# Patient Record
Sex: MALE | Race: WHITE | NOT HISPANIC OR LATINO
[De-identification: names, ages, dates, MRNs, and addresses within clinical notes are randomized per-mention and may not be internally consistent; named-entity substitution may affect disease eponyms.]

---

## 2020-04-30 ENCOUNTER — APPOINTMENT (OUTPATIENT)
Dept: RADIOLOGY | Facility: HOSPITAL | Age: 65
End: 2020-04-30
Payer: COMMERCIAL

## 2020-04-30 ENCOUNTER — RESULT REVIEW (OUTPATIENT)
Age: 65
End: 2020-04-30

## 2020-04-30 ENCOUNTER — OUTPATIENT (OUTPATIENT)
Dept: OUTPATIENT SERVICES | Facility: HOSPITAL | Age: 65
LOS: 1 days | End: 2020-04-30
Payer: COMMERCIAL

## 2020-04-30 DIAGNOSIS — M12.819 OTHER SPECIFIC ARTHROPATHIES, NOT ELSEWHERE CLASSIFIED, UNSPECIFIED SHOULDER: ICD-10-CM

## 2020-04-30 PROBLEM — Z00.00 ENCOUNTER FOR PREVENTIVE HEALTH EXAMINATION: Status: ACTIVE | Noted: 2020-04-30

## 2020-04-30 PROCEDURE — 73030 X-RAY EXAM OF SHOULDER: CPT

## 2020-04-30 PROCEDURE — 73030 X-RAY EXAM OF SHOULDER: CPT | Mod: 26,RT

## 2020-05-18 ENCOUNTER — APPOINTMENT (OUTPATIENT)
Dept: ORTHOPEDIC SURGERY | Facility: CLINIC | Age: 65
End: 2020-05-18
Payer: COMMERCIAL

## 2020-05-18 VITALS — HEIGHT: 67 IN | BODY MASS INDEX: 25.9 KG/M2 | WEIGHT: 165 LBS

## 2020-05-18 DIAGNOSIS — M25.511 PAIN IN RIGHT SHOULDER: ICD-10-CM

## 2020-05-18 PROCEDURE — 99203 OFFICE O/P NEW LOW 30 MIN: CPT

## 2020-05-18 RX ORDER — TRAMADOL HYDROCHLORIDE 50 MG/1
50 TABLET, COATED ORAL
Qty: 30 | Refills: 0 | Status: ACTIVE | COMMUNITY
Start: 2020-05-18 | End: 1900-01-01

## 2020-05-18 RX ORDER — DIAZEPAM 5 MG/1
5 TABLET ORAL
Qty: 2 | Refills: 0 | Status: ACTIVE | COMMUNITY
Start: 2020-05-18 | End: 1900-01-01

## 2020-05-18 NOTE — PHYSICAL EXAM
[de-identified] : Right Upper Extremity\par o Shoulder :\par ¦ Inspection/Palpation : mild tenderness greater tuberosity, no swelling, no deformities\par ¦ Range of Motion : ACTIVE FORWARD ELEVATION: Measured at 150 degrees, ACTIVE EXTERNAL ROTATION: Measured at 70 degrees with pain , ACTIVE INTERNAL ROTATION: Measured at L1 degrees\par ¦ Strength : external rotation 5/5, internal rotation 5/5, supraspinatus 4/5\par ¦ Stability : no joint instability on provocative testing\par ¦ Tests/Signs : Neer (-), Jonah (+) Speed (-)\par o Upper Arm : no tenderness, no swelling, no deformities\par o Muscle Bulk : no atrophy\par o Sensation : sensation intact to light touch\par o Skin : no skin rash or discoloration\par o Vascular Exam : no edema, no cyanosis, radial and ulnar pulses normal\par \par Left Upper Extremity\par o Shoulder :\par ¦ Inspection/Palpation : no tenderness, no swelling, no deformities\par ¦ Range of Motion : ACTIVE FORWARD ELEVATION: Measured at 155 degrees, ACTIVE EXTERNAL ROTATION: Measured at 75 degrees, ACTIVE INTERNAL ROTATION: Measured at T12\par ¦ Strength : external rotation 5/5, internal rotation 5/5, supraspinatus 5/5\par ¦ Stability : no joint instability on provocative testing\par ¦ Tests/Signs : Neer (-), Mckenzie (-)\par o Upper Arm : no tenderness, no swelling, no deformities\par o Muscle Bulk : no atrophy\par o Sensation : sensation intact to light touch\par o Skin : no skin rash or discoloration\par o Vascular Exam : no edema, no cyanosis, radial and ulnar pulses normal\par \par \par \par \par  [de-identified] : o Xray Right Shoulder performed on 4/30/2020 at [facility]: Impression:\par ¦ Degenerative changes noted in the region of the acromioclavicular joint. \par \par

## 2020-05-18 NOTE — DISCUSSION/SUMMARY
[de-identified] : The underlying pathophysiology was reviewed in great detail with the patient as well as the various treatment options, including ice, analgesics, NSAIDs, Physical therapy, steroid injections.\par \par A prescription for a right shoulder MRI was provided to rule out rotator cuff tear. A prescription was provided for 2 tablets of Valium for the MRI due to claustrophobia. \par \par A prescription was provided for Tramadol \par \par FU once MRI results are obtained. \par \par All questions were answered, all alternatives discussed and the patient is in complete agreement with that plan. Follow-up appointment as instructed. Any issues and the patient will call or come in sooner.\par

## 2020-05-18 NOTE — HISTORY OF PRESENT ILLNESS
[de-identified] : SHALINI LERMA is a 64 year male RHD presenting to the office complaining of right shoulder pain.  Patient reports pain since Mid Feburary when he fell on his right shoulder while skiing.The patient describes the pain as a dull aching, and occasionally sharp pain localized to the anterior aspect of his right shoulder that is intermittent in nature. His  symptoms are exacerbated with any movement of the shoulder. Patient reports the pain does wake him up at night. \par Patient is taking NSAIDs for pain relief with good relief in symptoms. \par Patient denies any other complaints at this time.\par \par

## 2020-05-26 ENCOUNTER — OUTPATIENT (OUTPATIENT)
Dept: OUTPATIENT SERVICES | Facility: HOSPITAL | Age: 65
LOS: 1 days | End: 2020-05-26

## 2020-05-26 ENCOUNTER — APPOINTMENT (OUTPATIENT)
Dept: MRI IMAGING | Facility: IMAGING CENTER | Age: 65
End: 2020-05-26

## 2020-05-26 DIAGNOSIS — M25.511 PAIN IN RIGHT SHOULDER: ICD-10-CM

## 2020-06-02 ENCOUNTER — OUTPATIENT (OUTPATIENT)
Dept: OUTPATIENT SERVICES | Facility: HOSPITAL | Age: 65
LOS: 1 days | End: 2020-06-02
Payer: COMMERCIAL

## 2020-06-02 ENCOUNTER — APPOINTMENT (OUTPATIENT)
Dept: MRI IMAGING | Facility: CLINIC | Age: 65
End: 2020-06-02
Payer: COMMERCIAL

## 2020-06-02 DIAGNOSIS — Z00.8 ENCOUNTER FOR OTHER GENERAL EXAMINATION: ICD-10-CM

## 2020-06-02 PROCEDURE — 73221 MRI JOINT UPR EXTREM W/O DYE: CPT

## 2020-06-02 PROCEDURE — 73221 MRI JOINT UPR EXTREM W/O DYE: CPT | Mod: 26,RT

## 2020-06-09 ENCOUNTER — APPOINTMENT (OUTPATIENT)
Dept: ORTHOPEDIC SURGERY | Facility: CLINIC | Age: 65
End: 2020-06-09
Payer: COMMERCIAL

## 2020-06-09 PROCEDURE — 99453 REM MNTR PHYSIOL PARAM SETUP: CPT

## 2020-06-09 PROCEDURE — 99214 OFFICE O/P EST MOD 30 MIN: CPT | Mod: 25

## 2020-06-09 NOTE — HISTORY OF PRESENT ILLNESS
[de-identified] : SHALINI LERMA is a 64 year male RHD presenting to the office complaining of right shoulder pain.  Patient reports pain since Mid February when he fell on his right shoulder while skiing.The patient describes the pain as a dull aching, and occasionally sharp pain localized to the anterior aspect of his right shoulder that is intermittent in nature. His  symptoms are exacerbated with any movement of the shoulder. Patient reports the pain does wake him up at night. Patient is taking NSAIDs for pain relief with good relief in symptoms.  Patient is here today to review and discuss his MRI results of the left shoulder. Patient denies any other complaints at this time.\par \par

## 2020-06-09 NOTE — DISCUSSION/SUMMARY
[de-identified] : The underlying pathophysiology was reviewed in great detail with the patient as well as the various treatment options, including ice, analgesics, NSAIDs, Physical therapy, steroid injections and surgery.\par \par MRI was discussed and review at great length today. \par \par The patient wishes to proceed with SURGICAL INTERVENTION at this time. The risks and benefits of a ROTATOR CUFF REPAIR were discussed in great detail today, including but not limited to bleeding, infection, nerve injury, DVT, allergy to the anesthetic or to the implants, persistent pain, stiffness, scarring, swelling or deformity, or re-tear\par \par Surgical Outcomes System (SOS) was discussed at great length today. Patient consented and signed waiver.

## 2020-06-09 NOTE — PHYSICAL EXAM
[de-identified] : Right Upper Extremity\par o Shoulder :\par ¦ Inspection/Palpation : tenderness greater tuberosity, minimal tenderness AC joint no swelling, no deformities\par ¦ Range of Motion : ACTIVE FORWARD ELEVATION: Measured at 130 degrees with pain, ACTIVE EXTERNAL ROTATION: Measured at 55 degrees with pain , ACTIVE INTERNAL ROTATION: Measured at L5 degrees\par ¦ Strength : external rotation 5-/5, internal rotation 5/5, supraspinatus 4-/5 with pain\par ¦ Stability : no joint instability on provocative testing\par ¦ Tests/Signs : Neer (+ mild), Mckenzie (+) Speed (-)\par o Upper Arm : no tenderness, no swelling, no deformities\par o Muscle Bulk : no atrophy\par o Sensation : sensation intact to light touch\par o Skin : no skin rash or discoloration\par o Vascular Exam : no edema, no cyanosis, radial and ulnar pulses normal\par \par Left Upper Extremity\par o Shoulder :\par ¦ Inspection/Palpation : no tenderness, no swelling, no deformities\par ¦ Range of Motion : ACTIVE FORWARD ELEVATION: Measured at 155 degrees, ACTIVE EXTERNAL ROTATION: Measured at 75 degrees, ACTIVE INTERNAL ROTATION: Measured at T12\par ¦ Strength : external rotation 5/5, internal rotation 5/5, supraspinatus 5/5\par ¦ Stability : no joint instability on provocative testing\par ¦ Tests/Signs : Neer (-), Mckenzie (-)\par o Upper Arm : no tenderness, no swelling, no deformities\par o Muscle Bulk : no atrophy\par o Sensation : sensation intact to light touch\par o Skin : no skin rash or discoloration\par o Vascular Exam : no edema, no cyanosis, radial and ulnar pulses normal\par \par \par \par \par  [de-identified] : MRI of the Right shoulder performed on 06/02/2020: impression:\par 1. Full-thickness, full width tear of the supraspinatus tendon from its insertion with retraction, as above. \par 2. Advanced acromioclavicular osteoarthritis. \par 3. SLAP tear.

## 2020-07-09 VITALS
OXYGEN SATURATION: 98 % | TEMPERATURE: 99 F | SYSTOLIC BLOOD PRESSURE: 135 MMHG | HEART RATE: 66 BPM | HEIGHT: 67 IN | RESPIRATION RATE: 10 BRPM | WEIGHT: 171.3 LBS | DIASTOLIC BLOOD PRESSURE: 67 MMHG

## 2020-07-09 NOTE — H&P PST ADULT - NSICDXPROBLEM_GEN_ALL_CORE_FT
PROBLEM DIAGNOSES  Problem: Right shoulder pain  Assessment and Plan: right shoulder arthroscopic rotator cuff repair

## 2020-07-09 NOTE — H&P PST ADULT - RS GEN PE MLT RESP DETAILS PC
no rhonchi/no wheezes/good air movement/airway patent/no rales/breath sounds equal/clear to auscultation bilaterally/respirations non-labored

## 2020-07-09 NOTE — H&P PST ADULT - MUSCULOSKELETAL
details… detailed exam no joint swelling/right shoulder/decreased ROM due to pain/no joint erythema/diminished strength/no joint warmth/no calf tenderness/decreased ROM

## 2020-07-09 NOTE — H&P PST ADULT - HISTORY OF PRESENT ILLNESS
this is a 66 y/o male who has had right shoulder pain, weakness and decreased ROM since ski injury 2/20; tests show muscle torn off bone; to have repair of same

## 2020-07-09 NOTE — H&P PST ADULT - FUNCTIONAL SCREEN CURRENT LEVEL: BATHING, MLM
REVIEW OF SYSTEMS:  GENERAL: Patient denies fevers,chills,night sweats, excessive fatigue, anorexia, weight loss  ALLERGIC/IMMUNOLOGIC: no reactions  EYES: Patient denies diplopia, significant visual difficulties   ENT/MOUTH: Patient denies mucositis, problems with hearing, sore throat, or mouth sores, sinus drainage  ENDOCRINE: Patient denies diabetes, thyroid disease, hormone replacement, hot flashes or night sweats  HEMATOLOGIC/LYMPHATIC: Patient denies easy bruising or bleeding, tender or palpable lymph nodes  BREASTS: Patient denies abnormal masses of breast, nipple discharge, pain  RESPIRATORY:Patient denies dyspnea on exertion, chest pain, cough, hemoptysis  CARDIOVASCULAR: Patient denies anginal chest pain, palpitations, orthopnea,   peripheral edema  GASTROINTESTINAL: Patient denies nausea, vomiting, diarrhea, Gi bleeding,   constipation, change in bowel habits, heartburn, early satiety   : Patient denies abnormal genital masses, hematuria, hesitancy, dysuria,increase frequency, urgency, incontinence, problems with sexual activity  MUSCULOSKELETAL: Patient denies joint pain, swelling or redness, decreased range of motion +bilat hip pain comes and goes  SKIN: Patient denies chronic rashes, inflammation, ulceration or skin changes  NEUROLOGIC: Patient denies headache, blurred vision, areas of focal weakness or numbness, abnormal gait, sensory problems  PSYCHIATRIC: Patient denies insomnia, depression, anxiety, mood swings, oneal, psychotropic drugs       0 = independent

## 2020-07-09 NOTE — H&P PST ADULT - NSANTHOSAYNRD_GEN_A_CORE
No. AVE screening performed.  STOP BANG Legend: 0-2 = LOW Risk; 3-4 = INTERMEDIATE Risk; 5-8 = HIGH Risk

## 2020-07-13 ENCOUNTER — OUTPATIENT (OUTPATIENT)
Dept: OUTPATIENT SERVICES | Facility: HOSPITAL | Age: 65
LOS: 1 days | End: 2020-07-13
Payer: COMMERCIAL

## 2020-07-13 DIAGNOSIS — M75.101 UNSPECIFIED ROTATOR CUFF TEAR OR RUPTURE OF RIGHT SHOULDER, NOT SPECIFIED AS TRAUMATIC: ICD-10-CM

## 2020-07-13 DIAGNOSIS — M25.511 PAIN IN RIGHT SHOULDER: ICD-10-CM

## 2020-07-13 DIAGNOSIS — Z90.89 ACQUIRED ABSENCE OF OTHER ORGANS: Chronic | ICD-10-CM

## 2020-07-13 DIAGNOSIS — Z01.818 ENCOUNTER FOR OTHER PREPROCEDURAL EXAMINATION: ICD-10-CM

## 2020-07-13 DIAGNOSIS — Z98.52 VASECTOMY STATUS: Chronic | ICD-10-CM

## 2020-07-13 LAB — SARS-COV-2 RNA SPEC QL NAA+PROBE: SIGNIFICANT CHANGE UP

## 2020-07-13 PROCEDURE — U0003: CPT

## 2020-07-13 PROCEDURE — G0463: CPT

## 2020-07-14 ENCOUNTER — TRANSCRIPTION ENCOUNTER (OUTPATIENT)
Age: 65
End: 2020-07-14

## 2020-07-15 ENCOUNTER — RESULT REVIEW (OUTPATIENT)
Age: 65
End: 2020-07-15

## 2020-07-15 ENCOUNTER — APPOINTMENT (OUTPATIENT)
Dept: ORTHOPEDIC SURGERY | Facility: HOSPITAL | Age: 65
End: 2020-07-15

## 2020-07-15 ENCOUNTER — OUTPATIENT (OUTPATIENT)
Dept: OUTPATIENT SERVICES | Facility: HOSPITAL | Age: 65
LOS: 1 days | End: 2020-07-15
Payer: COMMERCIAL

## 2020-07-15 VITALS
HEIGHT: 67 IN | RESPIRATION RATE: 16 BRPM | SYSTOLIC BLOOD PRESSURE: 135 MMHG | HEART RATE: 66 BPM | TEMPERATURE: 99 F | DIASTOLIC BLOOD PRESSURE: 67 MMHG | OXYGEN SATURATION: 99 % | WEIGHT: 171.3 LBS

## 2020-07-15 VITALS
SYSTOLIC BLOOD PRESSURE: 136 MMHG | DIASTOLIC BLOOD PRESSURE: 70 MMHG | HEART RATE: 67 BPM | OXYGEN SATURATION: 96 % | RESPIRATION RATE: 16 BRPM

## 2020-07-15 DIAGNOSIS — Z90.89 ACQUIRED ABSENCE OF OTHER ORGANS: Chronic | ICD-10-CM

## 2020-07-15 DIAGNOSIS — Z98.52 VASECTOMY STATUS: Chronic | ICD-10-CM

## 2020-07-15 DIAGNOSIS — M75.101 UNSPECIFIED ROTATOR CUFF TEAR OR RUPTURE OF RIGHT SHOULDER, NOT SPECIFIED AS TRAUMATIC: ICD-10-CM

## 2020-07-15 PROCEDURE — 93005 ELECTROCARDIOGRAM TRACING: CPT

## 2020-07-15 PROCEDURE — 29827 SHO ARTHRS SRG RT8TR CUF RPR: CPT | Mod: RT

## 2020-07-15 PROCEDURE — 88304 TISSUE EXAM BY PATHOLOGIST: CPT | Mod: 26

## 2020-07-15 PROCEDURE — 88304 TISSUE EXAM BY PATHOLOGIST: CPT

## 2020-07-15 PROCEDURE — 93010 ELECTROCARDIOGRAM REPORT: CPT

## 2020-07-15 PROCEDURE — 29822 SHO ARTHRS SRG LMTD DBRDMT: CPT | Mod: RT,XS

## 2020-07-15 PROCEDURE — C1713: CPT

## 2020-07-15 PROCEDURE — 29823 SHO ARTHRS SRG XTNSV DBRDMT: CPT | Mod: RT

## 2020-07-15 RX ORDER — OXYCODONE HYDROCHLORIDE 5 MG/1
1 TABLET ORAL
Qty: 1 | Refills: 0
Start: 2020-07-15

## 2020-07-15 RX ORDER — SODIUM CHLORIDE 9 MG/ML
1000 INJECTION, SOLUTION INTRAVENOUS
Refills: 0 | Status: DISCONTINUED | OUTPATIENT
Start: 2020-07-15 | End: 2020-07-15

## 2020-07-15 RX ORDER — CHLORHEXIDINE GLUCONATE 213 G/1000ML
1 SOLUTION TOPICAL ONCE
Refills: 0 | Status: DISCONTINUED | OUTPATIENT
Start: 2020-07-15 | End: 2020-07-15

## 2020-07-15 RX ORDER — ONDANSETRON 8 MG/1
4 TABLET, FILM COATED ORAL ONCE
Refills: 0 | Status: DISCONTINUED | OUTPATIENT
Start: 2020-07-15 | End: 2020-07-15

## 2020-07-15 RX ORDER — OXYCODONE HYDROCHLORIDE 5 MG/1
1 TABLET ORAL
Qty: 20 | Refills: 0
Start: 2020-07-15

## 2020-07-15 RX ORDER — CEFAZOLIN SODIUM 1 G
2000 VIAL (EA) INJECTION ONCE
Refills: 0 | Status: DISCONTINUED | OUTPATIENT
Start: 2020-07-15 | End: 2020-07-15

## 2020-07-15 RX ORDER — APREPITANT 80 MG/1
40 CAPSULE ORAL ONCE
Refills: 0 | Status: DISCONTINUED | OUTPATIENT
Start: 2020-07-15 | End: 2020-07-15

## 2020-07-15 RX ORDER — OXYCODONE HYDROCHLORIDE 5 MG/1
5 TABLET ORAL ONCE
Refills: 0 | Status: DISCONTINUED | OUTPATIENT
Start: 2020-07-15 | End: 2020-07-15

## 2020-07-15 RX ORDER — HYDROMORPHONE HYDROCHLORIDE 2 MG/ML
0.5 INJECTION INTRAMUSCULAR; INTRAVENOUS; SUBCUTANEOUS
Refills: 0 | Status: DISCONTINUED | OUTPATIENT
Start: 2020-07-15 | End: 2020-07-15

## 2020-07-15 RX ADMIN — SODIUM CHLORIDE 75 MILLILITER(S): 9 INJECTION, SOLUTION INTRAVENOUS at 14:24

## 2020-07-15 NOTE — ASU DISCHARGE PLAN (ADULT/PEDIATRIC) - CALL YOUR DOCTOR IF YOU HAVE ANY OF THE FOLLOWING:
Wound/Surgical Site with redness, or foul smelling discharge or pus Pain not relieved by Medications/Bleeding that does not stop/Wound/Surgical Site with redness, or foul smelling discharge or pus/Fever greater than (need to indicate Fahrenheit or Celsius)

## 2020-07-15 NOTE — ASU DISCHARGE PLAN (ADULT/PEDIATRIC) - CARE PROVIDER_API CALL
Robles Elizabeth  [facility]  [address] [place] [address]  Phone: [phone]  Fax: [phone]  Follow Up Time:

## 2020-07-17 LAB — SURGICAL PATHOLOGY STUDY: SIGNIFICANT CHANGE UP

## 2020-07-23 ENCOUNTER — APPOINTMENT (OUTPATIENT)
Dept: ORTHOPEDIC SURGERY | Facility: CLINIC | Age: 65
End: 2020-07-23
Payer: COMMERCIAL

## 2020-07-24 ENCOUNTER — APPOINTMENT (OUTPATIENT)
Dept: ORTHOPEDIC SURGERY | Facility: CLINIC | Age: 65
End: 2020-07-24
Payer: COMMERCIAL

## 2020-07-24 PROCEDURE — 99024 POSTOP FOLLOW-UP VISIT: CPT

## 2020-07-24 NOTE — HISTORY OF PRESENT ILLNESS
[de-identified] : s/p  Right shoulder arthroscopy with  rotator cuff repair and debridement on 07/25/2020 [de-identified] : 65 year male presents for a post operative evaluation s/p Right shoulder arthroscopy with  rotator cuff repair and debridement on 07/25/2020. Patient states he is feeling good post operatively. Patient presents in an abduction sling. He reports diffuse right shoulder pain. He reports difficulty sleeping at night due to pain. \par Patient denies fever, chills, nausea or vomiting. Patient is here today for wound check and suture removal. .  [de-identified] : Right Upper Extremity\par o Shoulder :\par ¦ Inspection/Palpation : diffuse  tenderness, mild swelling, no deformities, sutures in place, clean, dry and intact. \par ¦ Range of Motion : Not assessed today. \par ¦ Strength : not assessed today.\par ¦ Stability : no joint instability on provocative testing.\par o Upper Arm : no tenderness, no swelling, no deformities\par o Muscle Bulk : no atrophy\par o Sensation : sensation intact to light touch\par o Skin : no skin rash or discoloration\par o Vascular Exam : no edema, no cyanosis, radial and ulnar pulses normal [de-identified] : Arthroscopy images reviewed in great detail with patient.\par \par \par Sutures were removed and Steri-Strips were placed. He was instructed to allow the Steri-Strips to fall off on their own.\par \par \par A home exercise sheet was given and discussed with the patient to follow.\par \par  FU 5 weeks \par \par All questions were answered, all alternatives discussed and the patient is in complete agreement with that plan. Follow-up appointment as instructed. Any issues and the patient will call or come in sooner.

## 2020-07-30 RX ORDER — OXYCODONE AND ACETAMINOPHEN 5; 325 MG/1; MG/1
5-325 TABLET ORAL EVERY 8 HOURS
Qty: 30 | Refills: 0 | Status: ACTIVE | COMMUNITY
Start: 2020-07-30 | End: 1900-01-01

## 2020-08-19 PROBLEM — K85.90 ACUTE PANCREATITIS WITHOUT NECROSIS OR INFECTION, UNSPECIFIED: Chronic | Status: ACTIVE | Noted: 2020-07-09

## 2020-08-19 PROBLEM — M25.511 PAIN IN RIGHT SHOULDER: Chronic | Status: ACTIVE | Noted: 2020-07-09

## 2020-09-01 ENCOUNTER — APPOINTMENT (OUTPATIENT)
Dept: ORTHOPEDIC SURGERY | Facility: CLINIC | Age: 65
End: 2020-09-01
Payer: COMMERCIAL

## 2020-09-01 PROCEDURE — 99024 POSTOP FOLLOW-UP VISIT: CPT

## 2020-09-01 RX ORDER — OXYCODONE 5 MG/1
5 TABLET ORAL
Qty: 20 | Refills: 0 | Status: ACTIVE | COMMUNITY
Start: 2020-07-15

## 2020-09-01 RX ORDER — NAPROXEN 500 MG/1
500 TABLET ORAL
Qty: 14 | Refills: 0 | Status: ACTIVE | COMMUNITY
Start: 2020-07-15

## 2020-09-01 RX ORDER — ZOLPIDEM TARTRATE 10 MG/1
10 TABLET ORAL
Qty: 30 | Refills: 0 | Status: ACTIVE | COMMUNITY
Start: 2020-07-02

## 2020-09-01 NOTE — HISTORY OF PRESENT ILLNESS
[Doing Well] : is doing well [Adequate Pain Control] : has adequate pain control [de-identified] : s/p  Right shoulder arthroscopy with rotator cuff repair and debridement on 07/25/2020 [de-identified] : 65 year male presents for a post operative evaluation s/p Right shoulder arthroscopy with  rotator cuff repair and debridement on 07/25/2020. Patient states he is feeling good post operatively.  PAtient states he slowly tapered use of the abduction sling at 5-6 weeks post operatively. He reports diffuse right shoulder pain that has been decreasing over time. He reports decreased difficulty sleeping at night due to pain. \par Patient denies fever, chills, nausea or vomiting. Patient is here today for wound check and repeat clinical evaluation  [de-identified] : Right Upper Extremity\par o Shoulder :\par ¦ Inspection/Palpation : greater tuberosity  tenderness, no swelling, no deformities, arthroscopic incisions well healed, clean and dry. No discharge or dehiscence. \par ¦ Range of Motion: PASSIVE FORWARD ELEVATION: Measured at 105 degrees, ACTIVE EXTERNAL ROTATION: Measured at 30 degrees, ACTIVE INTERNAL ROTATION: Measured at PSIS \par ¦ Strength : external rotation 5/5, internal rotation 5/5, supraspinatus not assessed today. \par ¦ Stability : no joint instability on provocative testing\par ¦ Tests/Signs : Neer and Mckenzie not assessed today\par o Upper Arm : no tenderness, no swelling, no deformities\par o Muscle Bulk : no atrophy\par o Sensation : sensation intact to light touch\par o Skin : no skin rash or discoloration\par o Vascular Exam : no edema, no cyanosis, radial and ulnar pulses normal [de-identified] : A prescription for Physical Therapy was provided. A protocol was provided. \par \par May discontinue use of sling. \par \par  FU 6 weeks \par \par All questions were answered, all alternatives discussed and the patient is in complete agreement with that plan. Follow-up appointment as instructed. Any issues and the patient will call or come in sooner.

## 2020-10-01 ENCOUNTER — APPOINTMENT (OUTPATIENT)
Dept: ORTHOPEDIC SURGERY | Facility: CLINIC | Age: 65
End: 2020-10-01
Payer: COMMERCIAL

## 2020-10-01 VITALS — BODY MASS INDEX: 26.68 KG/M2 | WEIGHT: 170 LBS | HEIGHT: 67 IN

## 2020-10-01 PROCEDURE — 99024 POSTOP FOLLOW-UP VISIT: CPT

## 2020-10-02 NOTE — HISTORY OF PRESENT ILLNESS
[Doing Well] : is doing well [Adequate Pain Control] : has adequate pain control [de-identified] : s/p  Right shoulder arthroscopy with rotator cuff repair and debridement on 07/25/2020 [de-identified] : 65 year male presents for a post operative evaluation s/p Right shoulder arthroscopy with  rotator cuff repair and debridement on 07/25/2020. Patient states he is feeling very good post operatively. He reports intermittent pain in the shoulder at night but reports decreased pain during the day. He has been attending physical therapy noting great improvements in range of motion.  \par Patient denies fever, chills, nausea or vomiting. Patient is here today for wound check and repeat clinical evaluation  [de-identified] : Right Upper Extremity\par o Shoulder :\par ¦ Inspection/Palpation: no greater tuberosity  tenderness, no swelling, no deformities, arthroscopic incisions well healed, clean and dry. No discharge or dehiscence. \par ¦ Range of Motion: ACTIVE FORWARD ELEVATION: Measured at 130 degrees, ACTIVE EXTERNAL ROTATION: Measured at 55 degrees, ACTIVE INTERNAL ROTATION: Measured at T12 \par ¦ Strength : external rotation 5/5, internal rotation 5/5, supraspinatus 5/5\par ¦ Stability : no joint instability on provocative testing\par ¦ Tests/Signs : Neer (-)  and Mckenzie (-)\par o Upper Arm : no tenderness, no swelling, no deformities\par o Muscle Bulk : no atrophy\par o Sensation : sensation intact to light touch\par o Skin : no skin rash or discoloration\par o Vascular Exam : no edema, no cyanosis, radial and ulnar pulses normal [de-identified] : Activity modifications and restrictions were discussed. I advised avoiding overhead lifting. I advised the patient to work on good posture. \par \par Continue physical therapy as prescribed. \par \par  FU 6 weeks \par \par All questions were answered, all alternatives discussed and the patient is in complete agreement with that plan. Follow-up appointment as instructed. Any issues and the patient will call or come in sooner.

## 2020-12-14 ENCOUNTER — APPOINTMENT (OUTPATIENT)
Dept: SURGERY | Facility: CLINIC | Age: 65
End: 2020-12-14
Payer: COMMERCIAL

## 2020-12-14 VITALS — BODY MASS INDEX: 26.68 KG/M2 | HEIGHT: 67 IN | WEIGHT: 170 LBS

## 2020-12-14 DIAGNOSIS — Z12.12 ENCOUNTER FOR SCREENING FOR MALIGNANT NEOPLASM OF COLON: ICD-10-CM

## 2020-12-14 DIAGNOSIS — Z12.11 ENCOUNTER FOR SCREENING FOR MALIGNANT NEOPLASM OF COLON: ICD-10-CM

## 2020-12-14 PROCEDURE — 99072 ADDL SUPL MATRL&STAF TM PHE: CPT

## 2020-12-14 PROCEDURE — 99203 OFFICE O/P NEW LOW 30 MIN: CPT

## 2020-12-14 RX ORDER — SODIUM PICOSULFATE, MAGNESIUM OXIDE, AND ANHYDROUS CITRIC ACID 10; 3.5; 12 MG/160ML; G/160ML; G/160ML
10-3.5-12 MG-GM LIQUID ORAL
Qty: 1 | Refills: 0 | Status: ACTIVE | COMMUNITY
Start: 2020-12-14 | End: 1900-01-01

## 2020-12-14 RX ORDER — METHOCARBAMOL 750 MG/1
750 TABLET, FILM COATED ORAL
Qty: 60 | Refills: 0 | Status: ACTIVE | COMMUNITY
Start: 2020-11-16

## 2020-12-15 NOTE — PHYSICAL EXAM
[Normal Breath Sounds] : Normal breath sounds [Normal Heart Sounds] : normal heart sounds [Normal Rate and Rhythm] : normal rate and rhythm [Abdominal Masses] : No abdominal masses [Abdomen Tenderness] : ~T ~M No abdominal tenderness [No Rash or Lesion] : No rash or lesion [de-identified] : nl [de-identified] : nl [de-identified] : nl

## 2020-12-15 NOTE — ASSESSMENT
[FreeTextEntry1] : Long discussion regarding all options and risks\par Bowel prep written and explained\par Scheduled for colonoscopy on 1/12/21

## 2021-01-09 ENCOUNTER — LABORATORY RESULT (OUTPATIENT)
Age: 66
End: 2021-01-09

## 2021-01-11 ENCOUNTER — TRANSCRIPTION ENCOUNTER (OUTPATIENT)
Age: 66
End: 2021-01-11

## 2021-01-12 ENCOUNTER — OUTPATIENT (OUTPATIENT)
Dept: OUTPATIENT SERVICES | Facility: HOSPITAL | Age: 66
LOS: 1 days | End: 2021-01-12
Payer: COMMERCIAL

## 2021-01-12 DIAGNOSIS — Z12.11 ENCOUNTER FOR SCREENING FOR MALIGNANT NEOPLASM OF COLON: ICD-10-CM

## 2021-01-12 DIAGNOSIS — Z90.89 ACQUIRED ABSENCE OF OTHER ORGANS: Chronic | ICD-10-CM

## 2021-01-12 DIAGNOSIS — Z98.52 VASECTOMY STATUS: Chronic | ICD-10-CM

## 2021-01-12 PROCEDURE — 45378 DIAGNOSTIC COLONOSCOPY: CPT | Mod: 33

## 2021-01-12 PROCEDURE — G0121: CPT

## 2021-01-12 RX ORDER — SODIUM CHLORIDE 9 MG/ML
500 INJECTION INTRAMUSCULAR; INTRAVENOUS; SUBCUTANEOUS
Refills: 0 | Status: COMPLETED | OUTPATIENT
Start: 2021-01-12 | End: 2021-01-12

## 2021-01-12 RX ADMIN — SODIUM CHLORIDE 75 MILLILITER(S): 9 INJECTION INTRAMUSCULAR; INTRAVENOUS; SUBCUTANEOUS at 08:09

## 2021-01-18 NOTE — REASON FOR VISIT
[Follow-Up Visit] : a follow-up visit for [Shoulder Pain] : shoulder pain [Aftercare Following Surgery] : aftercare following surgery

## 2021-01-19 ENCOUNTER — APPOINTMENT (OUTPATIENT)
Dept: ORTHOPEDIC SURGERY | Facility: CLINIC | Age: 66
End: 2021-01-19
Payer: COMMERCIAL

## 2021-01-19 VITALS — BODY MASS INDEX: 26.68 KG/M2 | WEIGHT: 170 LBS | HEIGHT: 67 IN

## 2021-01-19 DIAGNOSIS — M75.101 UNSPECIFIED ROTATOR CUFF TEAR OR RUPTURE OF RIGHT SHOULDER, NOT SPECIFIED AS TRAUMATIC: ICD-10-CM

## 2021-01-19 PROCEDURE — 99213 OFFICE O/P EST LOW 20 MIN: CPT

## 2021-01-19 PROCEDURE — 99072 ADDL SUPL MATRL&STAF TM PHE: CPT

## 2021-01-19 NOTE — HISTORY OF PRESENT ILLNESS
[de-identified] : 65 year male presents for a post operative evaluation s/p Right shoulder arthroscopy with rotator cuff repair and debridement on 07/25/2020. Patient states he is feeling very good post operatively. He reports intermittent pain in the shoulder at night but reports decreased pain during the day. He has discontinued formal physical therapy since last visit. He  has been performing a home exercise noting improvements in range of motion and strength.

## 2021-01-19 NOTE — PHYSICAL EXAM
[de-identified] : Right Upper Extremity\par o Shoulder :\par ¦ Inspection/Palpation: no greater tuberosity tenderness, no swelling, no deformities, arthroscopic incisions well healed, clean and dry. No discharge or dehiscence. \par ¦ Range of Motion: ACTIVE FORWARD ELEVATION: Measured at 150 degrees, ACTIVE EXTERNAL ROTATION: Measured at 60 degrees, ACTIVE INTERNAL ROTATION: Measured at T8\par ¦ Strength : external rotation 5/5, internal rotation 5/5, supraspinatus 5/5\par ¦ Stability : no joint instability on provocative testing\par ¦ Tests/Signs : Neer (-) and Mckenzie (-)\par o Upper Arm : no tenderness, no swelling, no deformities\par o Muscle Bulk : no atrophy\par o Sensation : sensation intact to light touch\par o Skin : no skin rash or discoloration\par o Vascular Exam : no edema, no cyanosis, radial and ulnar pulses normal.

## 2021-01-19 NOTE — DISCUSSION/SUMMARY
[de-identified] : Postoperatively, the patient is doing very well.\par \par Activity modifications and restrictions were discussed. I advised avoiding overhead lifting. I advised the patient to work on good posture. \par \par Continue home exercise program. \par \par  FU prn\par \par All questions were answered, all alternatives discussed and the patient is in complete agreement with that plan. Follow-up appointment as instructed. Any issues and the patient will call or come in sooner.

## 2021-09-29 NOTE — HISTORY OF PRESENT ILLNESS
[de-identified] : This 65 year old man has never undergone a colonoscopy. He is asymptomatic regarding his GI tract. There is no FH of colon CA.
No
